# Patient Record
Sex: MALE | Race: WHITE | Employment: FULL TIME | URBAN - METROPOLITAN AREA
[De-identification: names, ages, dates, MRNs, and addresses within clinical notes are randomized per-mention and may not be internally consistent; named-entity substitution may affect disease eponyms.]

---

## 2024-10-30 ENCOUNTER — OFFICE VISIT (OUTPATIENT)
Age: 39
End: 2024-10-30

## 2024-10-30 VITALS
BODY MASS INDEX: 25.45 KG/M2 | OXYGEN SATURATION: 97 % | HEART RATE: 59 BPM | SYSTOLIC BLOOD PRESSURE: 110 MMHG | TEMPERATURE: 97.7 F | DIASTOLIC BLOOD PRESSURE: 66 MMHG | HEIGHT: 73 IN | WEIGHT: 192 LBS | RESPIRATION RATE: 17 BRPM

## 2024-10-30 DIAGNOSIS — Z30.09 VASECTOMY EVALUATION: Primary | ICD-10-CM

## 2024-10-30 NOTE — PROGRESS NOTES
PROGRESS NOTE    SUBJECTIVE:   Abdi Villatoro is a 39 y.o. male seen in the [facility] located at [facility] for request for referral for vasectomy.     Chief Complaint    Referral - General             No current outpatient medications on file.     No current facility-administered medications for this visit.      Allergies   Allergen Reactions    Cephalexin Diarrhea                    OBJECTIVE:  /66 (Site: Right Upper Arm, Position: Sitting, Cuff Size: Medium Adult)   Pulse 59   Temp 97.7 °F (36.5 °C) (Oral)   Resp 17   Ht 1.854 m (6' 1\")   Wt 87.1 kg (192 lb)   SpO2 97%   BMI 25.33 kg/m²      No results found for this visit on 10/30/24.        ASSESSMENT and PLAN    Abdi was seen today for referral - general.    Diagnoses and all orders for this visit:    Vasectomy evaluation  -     [facility]        Counseled on benefits of having a primary care provider which includes, but is not limited to, continuity of care and having a medical home when concerns arise. Also enforced that onsite clinic policy states that we are not to take the place of a primary care provider, pt verbalized understanding.     SEs and risk vs benefits associated with medications prescribed discussed with patient who verbalized understanding. Pt verbalized understanding and agreement with plan of care. RTC for persisting/worsening symptoms or new complaints that arise. Discussed signs and symptoms that would warrant immediate evaluation including, but not limited to HA, blurred vision, speech disturbance, difficulty with ambulation/gait, numbness, tingling, weakness, syncope, chest pain, or shortness of breath.    I have reviewed the patient's medication list, past medical, family, social, and surgical history in detail and updated the patient record appropriately.    DANIELLE Espinosa - NP

## 2025-01-02 ENCOUNTER — OFFICE VISIT (OUTPATIENT)
Age: 40
End: 2025-01-02

## 2025-01-02 VITALS
RESPIRATION RATE: 16 BRPM | HEART RATE: 58 BPM | HEIGHT: 73 IN | DIASTOLIC BLOOD PRESSURE: 72 MMHG | BODY MASS INDEX: 26.14 KG/M2 | TEMPERATURE: 97.9 F | WEIGHT: 197.2 LBS | OXYGEN SATURATION: 99 % | SYSTOLIC BLOOD PRESSURE: 108 MMHG

## 2025-01-02 DIAGNOSIS — J06.9 VIRAL UPPER RESPIRATORY TRACT INFECTION: Primary | ICD-10-CM

## 2025-01-02 ASSESSMENT — ENCOUNTER SYMPTOMS
SHORTNESS OF BREATH: 0
SORE THROAT: 0
EYE ITCHING: 0
NAUSEA: 0
SINUS PAIN: 0
RHINORRHEA: 1
WHEEZING: 0
SINUS PRESSURE: 1
EYE REDNESS: 0
VOMITING: 0
SINUS COMPLAINT: 1
COUGH: 1
DIARRHEA: 0
EYE PAIN: 0

## 2025-01-02 NOTE — PROGRESS NOTES
PROGRESS NOTE    SUBJECTIVE:   Abdi Villatoro is a 39 y.o. male seen in the [facility] based [facility] center located at [facility] for chills, and fever at nighttime for the past 4 days. Patient states he tested negative for flu, strep, and covid 2 days ago. Patient reports he did have a severe dermatological reaction to insulation in mid Nov for which he received prednisone and had success with that. 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 Patient reports wearing an N95 mask while doing so, but states some particles could still get in due to lack of fit. Patient fever with sinus congestion and rhinitis worse at night and better throughout the day. No other S/S at this time.     Chief Complaint    Fever; Cough           Sinus Problem  This is a new problem. The current episode started in the past 7 days. The problem has been waxing and waning since onset. The maximum temperature recorded prior to his arrival was 101 - 101.9 F. The fever has been present for 3 to 4 days. The pain is mild. Associated symptoms include chills, congestion, coughing and sinus pressure. Pertinent negatives include no ear pain, shortness of breath, sneezing or sore throat. Past treatments include acetaminophen. The treatment provided moderate relief.       No current outpatient medications on file.     No current facility-administered medications for this visit.      Allergies   Allergen Reactions    Cephalexin Diarrhea       Social History     Tobacco Use    Smoking status: Never     Passive exposure: Never    Smokeless tobacco: Never   Substance Use Topics    Drug use: Never        Review of Systems   Constitutional:  Positive for chills, fatigue and fever.   HENT:  Positive for congestion, postnasal drip, rhinorrhea and sinus pressure. Negative for ear pain, sinus pain, sneezing and sore throat.    Eyes:  Negative for pain, redness and itching.   Respiratory:  Positive for

## 2025-05-06 ENCOUNTER — OFFICE VISIT (OUTPATIENT)
Age: 40
End: 2025-05-06

## 2025-05-06 VITALS
TEMPERATURE: 97.8 F | OXYGEN SATURATION: 97 % | BODY MASS INDEX: 25.31 KG/M2 | HEIGHT: 73 IN | DIASTOLIC BLOOD PRESSURE: 58 MMHG | WEIGHT: 191 LBS | RESPIRATION RATE: 17 BRPM | SYSTOLIC BLOOD PRESSURE: 114 MMHG | HEART RATE: 75 BPM

## 2025-05-06 DIAGNOSIS — L24.7 IRRITANT CONTACT DERMATITIS DUE TO PLANTS, EXCEPT FOOD: Primary | ICD-10-CM

## 2025-05-06 RX ORDER — PREDNISONE 10 MG/1
10 TABLET ORAL 2 TIMES DAILY
Qty: 10 TABLET | Refills: 0 | Status: SHIPPED | OUTPATIENT
Start: 2025-05-06 | End: 2025-05-11

## 2025-05-07 ASSESSMENT — ENCOUNTER SYMPTOMS
RHINORRHEA: 0
EYE PAIN: 0
EYES NEGATIVE: 1
NAUSEA: 0
ABDOMINAL PAIN: 0
SINUS PRESSURE: 0
SHORTNESS OF BREATH: 0
COUGH: 0

## 2025-05-07 NOTE — PROGRESS NOTES
PROGRESS NOTE    SUBJECTIVE:   Abdi Villatoro is a 39 y.o. male seen in the [facility] based [facility] center located at  [place] for rash on arms from poison ivy. Patient states rash gets much worse usually and would like to get ahead of it this time.     Chief Complaint    Rash           Rash  Pertinent negatives include no cough, eye pain, fatigue, fever, rhinorrhea or shortness of breath.       Current Outpatient Medications   Medication Sig Dispense Refill    predniSONE (DELTASONE) 10 MG tablet Take 1 tablet by mouth 2 times daily for 5 days 10 tablet 0     No current facility-administered medications for this visit.      Allergies   Allergen Reactions    Cephalexin Diarrhea    Bactrim [Sulfamethoxazole-Trimethoprim] Hives       Social History     Tobacco Use    Smoking status: Never     Passive exposure: Never    Smokeless tobacco: Never   Substance Use Topics    Drug use: Never        Review of Systems   Constitutional:  Negative for fatigue and fever.   HENT:  Negative for rhinorrhea and sinus pressure.    Eyes: Negative.  Negative for pain.   Respiratory:  Negative for cough and shortness of breath.    Cardiovascular: Negative.    Gastrointestinal:  Negative for abdominal pain and nausea.   Musculoskeletal: Negative.    Skin:  Positive for rash.   Neurological:  Negative for dizziness, weakness, light-headedness and numbness.          OBJECTIVE:  BP (!) 114/58   Pulse 75   Temp 97.8 °F (36.6 °C) (Oral)   Resp 17   Ht 1.854 m (6' 1\")   Wt 86.6 kg (191 lb)   SpO2 97%   BMI 25.20 kg/m²      No results found for this visit on 05/06/25.    Physical Exam  Constitutional:       Appearance: Normal appearance.   HENT:      Right Ear: Tympanic membrane normal.      Left Ear: Tympanic membrane normal.      Mouth/Throat:      Pharynx: No oropharyngeal exudate.   Eyes:      Extraocular Movements: Extraocular movements intact.      Pupils: Pupils are equal, round, and reactive to light.   Cardiovascular: